# Patient Record
Sex: FEMALE | Race: WHITE
[De-identification: names, ages, dates, MRNs, and addresses within clinical notes are randomized per-mention and may not be internally consistent; named-entity substitution may affect disease eponyms.]

---

## 2018-08-31 ENCOUNTER — HOSPITAL ENCOUNTER (EMERGENCY)
Dept: HOSPITAL 89 - ER | Age: 23
Discharge: HOME | End: 2018-08-31
Payer: COMMERCIAL

## 2018-08-31 VITALS — DIASTOLIC BLOOD PRESSURE: 82 MMHG | SYSTOLIC BLOOD PRESSURE: 107 MMHG

## 2018-08-31 DIAGNOSIS — R22.0: Primary | ICD-10-CM

## 2018-08-31 PROCEDURE — 99283 EMERGENCY DEPT VISIT LOW MDM: CPT

## 2018-08-31 NOTE — ER REPORT
History and Physical


Time Seen By MD:  16:07


HPI/ROS


CHIEF COMPLAINT: Tongue swelling





HISTORY OF PRESENT ILLNESS: This is a 23-year-old female presents to the ER for 


tongue swelling. Patient states that about 50 minutes prior to arrival she had 


some chicken nuggets at [employer], she also had some barbecue sauce. Patient 


states she had a low bit of tingling on the tip of her tongue with what she 


describes as swelling, no respiratory compromise, no other swelling, no 


diaphoresis chest pain, nausea or vomiting. Patient is interacting well, no 


stridor. It is just the tip of the tongue itself.





REVIEW OF SYSTEMS:


Respiratory: No cough, no dyspnea.


Cardiovascular: No chest pain, no palpitations.


Gastrointestinal: No vomiting, no abdominal pain.


Musculoskeletal: No back pain.


ENT: As above.


Allergies:  


Coded Allergies:  


     No Known Drug Allergies (Unverified , 8/31/18)


Home Meds


Reported Medications


[Iud]   No Conflict Check


   8/31/18


Phentermine Hcl (PHENTERMINE HCL) 37.5 Mg Capsule, 37.5 MG PO QDAY, CAPSULE


   8/31/18


Escitalopram Oxalate (LEXAPRO) 20 Mg Tablet, 20 MG PO QDAY, TAB


   8/31/18


Methylphenidate Hcl (METHYLPHENIDATE ER) 36 Mg Tab.er.24, 36 MG PO QDAY


   8/31/18


Discontinued Reported Medications


Methylphenidate Hcl (METHYLPHENIDATE ER) 18 Mg Tab.er.24, 18 MG PO QDAY


   5/31/17


Norgestimate-Ethinyl Estradiol (SPRINTEC) 1 Each Tablet, 1 TAB PO QDAY


   3/14/17


Past Medical/Surgical History


The patient has a past medical and surgical history of polycystic ovarian 


disease. Deviated septum, [place] teeth extraction, tonsillectomy.


Reviewed Nurses Notes:  Yes


Hx Smoking:  No


Smoking Status:  Never Smoker


Hx Substance Use Disorder:  No


Hx Alcohol Use:  No


Constitutional





Vital Sign - Last 24 Hours








 8/31/18 8/31/18 8/31/18





 16:04 16:15 16:30


 


Temp 97.9  


 


Pulse 110 100 97


 


Resp 14  


 


B/P (MAP) 115/87  107/82 (90)


 


Pulse Ox 96 97 97


 


O2 Delivery Room Air  








Physical Exam


  General Appearance: The patient is alert, has no immediate need for airway 


protection and no current signs of toxicity. 


Eyes: Pupils equal and round no injection.


Mouth: No appreciable swelling of the tongue small amount of redness noted to 


the very tip of the tongue. No stridorous lung sounds. Mucosa pink and moist. NO


 edema.


Respiratory: Chest is non tender, lungs are clear to auscultation.


Cardiac: regular rate and rhythm.


Gastrointestinal: Abdomen is soft and non tender, no masses, bowel sounds 


normal.


Musculoskeletal:  Neck: Neck is supple and non tender.


   Extremities have full range of motion and are non tender.


Skin: No rashes or lesions.





DIFFERENTIAL DIAGNOSIS: After history and physical exam differential diagnosis 


was considered for anaphylaxis, allergic reaction.





Medical Decision Making


ED Course/Re-evaluation


ED Course


The patient was admitted to room. A history and physical were obtained. 


Differential diagnoses were considered. After close examination the patient has 


no indication of anaphylaxis or severe allergic reaction, there was just a small


 area of redness and slight irritation to the tip of the tongue. Patient was 


given 25 mg Benadryl and instructed to monitor her food intake. The patient had 


no other questions or concerns at this time and was discharged home.


Decision to Disposition Date:  Aug 31, 2018


Decision to Disposition Time:  16:21





Depart


Departure


Latest Vital Signs





Vital Signs








  Date Time  Temp Pulse Resp B/P (MAP) Pulse Ox O2 Delivery O2 Flow Rate FiO2


 


8/31/18 16:30  97  107/82 (90) 97   


 


8/31/18 16:04 97.9  14   Room Air  








Impression:  


   Primary Impression:  


   Mild tongue swelling


Condition:  Improved


Disposition:  HOME OR SELF-CARE


Patient Instructions:  General Allergic Reaction (ED)





Additional Instructions:  


If you feel the swelling continues can take 25 mg of Benadryl every 4-6 hours as


 needed for swelling.


Should this develop into anything more concerning such as shortness of breath, 


nausea or vomiting or any respiratory compromise call 911 or return to the 


emergency department immediately.


I would avoid the chicken nuggets and the barbecue sauce in the future just to 


ensure no further complications arise.


Follow-up with her primary care provider as scheduled.


Drink plenty of water.


Get plenty of rest.


Return to ER for any other concerns or worsening symptoms.











PAXTON MALHOTRA FNP-BC      Aug 31, 2018 16:06

## 2018-12-06 ENCOUNTER — HOSPITAL ENCOUNTER (EMERGENCY)
Dept: HOSPITAL 89 - ER | Age: 23
Discharge: HOME | End: 2018-12-06
Payer: COMMERCIAL

## 2018-12-06 VITALS — DIASTOLIC BLOOD PRESSURE: 75 MMHG | SYSTOLIC BLOOD PRESSURE: 117 MMHG

## 2018-12-06 DIAGNOSIS — N30.00: Primary | ICD-10-CM

## 2018-12-06 DIAGNOSIS — Z97.5: ICD-10-CM

## 2018-12-06 LAB — PLATELET COUNT, AUTOMATED: 300 K/UL (ref 150–450)

## 2018-12-06 PROCEDURE — 82310 ASSAY OF CALCIUM: CPT

## 2018-12-06 PROCEDURE — 96361 HYDRATE IV INFUSION ADD-ON: CPT

## 2018-12-06 PROCEDURE — 82247 BILIRUBIN TOTAL: CPT

## 2018-12-06 PROCEDURE — 84132 ASSAY OF SERUM POTASSIUM: CPT

## 2018-12-06 PROCEDURE — 74177 CT ABD & PELVIS W/CONTRAST: CPT

## 2018-12-06 PROCEDURE — 84075 ASSAY ALKALINE PHOSPHATASE: CPT

## 2018-12-06 PROCEDURE — 99284 EMERGENCY DEPT VISIT MOD MDM: CPT

## 2018-12-06 PROCEDURE — 81001 URINALYSIS AUTO W/SCOPE: CPT

## 2018-12-06 PROCEDURE — 84703 CHORIONIC GONADOTROPIN ASSAY: CPT

## 2018-12-06 PROCEDURE — 82374 ASSAY BLOOD CARBON DIOXIDE: CPT

## 2018-12-06 PROCEDURE — 82040 ASSAY OF SERUM ALBUMIN: CPT

## 2018-12-06 PROCEDURE — 84295 ASSAY OF SERUM SODIUM: CPT

## 2018-12-06 PROCEDURE — 96374 THER/PROPH/DIAG INJ IV PUSH: CPT

## 2018-12-06 PROCEDURE — 82435 ASSAY OF BLOOD CHLORIDE: CPT

## 2018-12-06 PROCEDURE — 85025 COMPLETE CBC W/AUTO DIFF WBC: CPT

## 2018-12-06 PROCEDURE — 87088 URINE BACTERIA CULTURE: CPT

## 2018-12-06 PROCEDURE — 84155 ASSAY OF PROTEIN SERUM: CPT

## 2018-12-06 PROCEDURE — 84450 TRANSFERASE (AST) (SGOT): CPT

## 2018-12-06 PROCEDURE — 84460 ALANINE AMINO (ALT) (SGPT): CPT

## 2018-12-06 PROCEDURE — 82565 ASSAY OF CREATININE: CPT

## 2018-12-06 PROCEDURE — 82947 ASSAY GLUCOSE BLOOD QUANT: CPT

## 2018-12-06 PROCEDURE — 84520 ASSAY OF UREA NITROGEN: CPT

## 2018-12-06 NOTE — RADIOLOGY IMAGING REPORT
FACILITY: [facility] 

 

PATIENT NAME: Clary Mays

: 1995

MR: [id number]

V: [id number]

EXAM DATE: [id number]

ORDERING PHYSICIAN: BETTINA FRY

TECHNOLOGIST: 

 

Location: [facility]

Patient: Clary Mays

: 1995

MRN: [id number]

Visit/Account:[id number]

Date of Sevice: 2018

 

ACCESSION #: [id number]

 

ABDOMEN/PELVIS WITH CONTRAST

 

HISTORY: Suprapubic abdominal pain. Right CVA discomfort.

 

COMPARISON: None.

 

TECHNIQUE: Axial images were obtained from the lung bases through the symphysis pubis with intravenou
s contrast. Sagittal and coronal reformats were performed.

 

One of the following dose optimization techniques was utilized in the performance of this exam: Autom
ated exposure control; adjustment of the mA and/or kV according to the patient's size; or use of an i
terative reconstruction technique. Specific details can be referenced in the facility's radiology CT 
exam operational policy.

 

CONTRAST: 75 mL IV Isovue-370.

 

 

FINDINGS:

 

Lower chest: There is a 2 x 2 mm left lower lobe pulmonary nodule (image 35 series 2).

 

Liver: Normal.

 

Gallbladder/biliary: Normal.

 

Pancreas: Normal.

 

Spleen: Normal.

 

Adrenals: Normal.

 

Kidneys/ureters/bladder: Normal. There is a tiny amount of gas within bladder, presumed from catheter
ization.

 

GI/mesentery/peritoneal cavity: There is no bowel obstruction. There is no wall thickening or pericol
onic stranding. The appendix is normal. There is no free air or free fluid.

 

Vessels: No atherosclerotic disease. No aneurysm. No dissection. 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

 

Nodes: Normal.

 

Pelvis: IUD is in expected location within the uterus. Ovaries are normal.

 

Bones/vertebra/soft tissues: Normal.

 

IMPRESSION:

1. No findings in the abdomen or pelvis to account for the patient's symptoms.

2. 2 mm average size right lower lobe pulmonary nodule.

 

Current Fleischner Society recommendations for incidental <6mm pulmonary nodule (average of long and 
short axis) on incomplete thoracic CT scan:

No further investigation is recommended given the estimated low risk of malignancy.

Linus H, Marilynn D, Kenneth J, et al. Guidelines for management of small pulmonary nodules detected on
 CT images: from the Fleischner society 2017.

 

Report Dictated By: Yamila Arechiga at 2018 5:31 AM

 

Report E-Signed By: Yamila Arechiga  at 2018 5:39 AM

 

WSN:[id number]

## 2018-12-06 NOTE — ER REPORT
History and Physical


Time Seen By MD:  04:10


Hx. of Stated Complaint:  


Pt states "I was messing around with my IUD string and now my belly hurts and I 


have blood in my discharge."


HPI/ROS


CHIEF COMPLAINT: abdominal pain





HISTORY OF PRESENT ILLNESS: This is a 23 year old female. She reports having 


some suprapubic area and periumbilical abdominal pain. This started suddenly 


tonight. She was messing around with her IUD string while having sexual activity


when she started having the pain. She did not feel the string give way or 


lengthen or shorten. She is having a little bit of spotting with her vaginal 


discharge as well. She has some nausea. Pain is described as crampy and does not


radiate. She has had no problems with urination or bowels earlier today and has 


been in good health. No history of abdominal surgeries. No fevers or chills.


Allergies:  


Coded Allergies:  


     No Known Drug Allergies (Unverified , 12/6/18)


Home Meds


Active Scripts


Ondansetron (ONDANSETRON ODT) 4 Mg Tab.rapdis, 4 MG PO Q6H PRN for 


NAUSEA/VOMITING, #20 TAB 0 Refills


   Prov:BETTINA FRY MD         12/6/18


Sulfamethoxazole/Trimet 800-160 Mg Tab (BACTRIM DS TABLET) 1 Each Tablet, 1 TAB 


PO Q12H, #14 TAB 0 Refills


   Prov:BETTINA FRY MD         12/6/18


Reported Medications


[Iud]   No Conflict Check


   8/31/18


Escitalopram Oxalate (LEXAPRO) 20 Mg Tablet, 20 MG PO QDAY, TAB


   8/31/18


Methylphenidate Hcl (METHYLPHENIDATE ER) 36 Mg Tab.er.24, 36 MG PO QDAY


   8/31/18


Discontinued Reported Medications


Phentermine Hcl (PHENTERMINE HCL) 37.5 Mg Capsule, 37.5 MG PO QDAY, CAPSULE


   8/31/18


Reviewed Nurses Notes:  Yes


Hx Smoking:  No


Smoking Status:  Never Smoker


Hx Substance Use Disorder:  No


Hx Alcohol Use:  No


Constitutional





Vital Sign - Last 24 Hours








 12/6/18 12/6/18 12/6/18 12/6/18





 03:43 03:46 03:54 04:00


 


Temp 97.9   


 


Pulse 79  86 


 


Resp 16   


 


B/P (MAP) 139/101 139/101 (114)  134/91 (105)


 


Pulse Ox 94  95 


 


O2 Delivery Room Air   


 


    





 12/6/18 12/6/18 12/6/18 12/6/18





 04:09 04:24 04:30 04:39


 


Pulse 82 74  79


 


B/P (MAP)   115/87 (96) 


 


Pulse Ox 96 94  93





 12/6/18 12/6/18 12/6/18 12/6/18





 04:54 05:14 05:24 05:29


 


Pulse 79  92 92


 


B/P (MAP)  134/98 (110)  


 


Pulse Ox 96   95





 12/6/18 12/6/18  





 05:30 05:44  


 


Pulse  83  


 


B/P (MAP) 117/75 (89)   


 


Pulse Ox  94  














Intake and Output   


 


 12/5/18 12/5/18 12/6/18





 14:59 22:59 06:59


 


Intake Total   1000 ml


 


Balance   1000 ml








Physical Exam


   General Appearance: The patient is alert. No acute distress.


Eyes: Pupils are equal, round. No pallor, injection or icterus.


ENT: Mucous membranes are moist.


Neck: Supple and non tender.


Respiratory: Lungs are clear to auscultation.


Cardiovascular: Regular rate and rhythm. No murmurs, gallops or rubs. Normal 


capillary refill.


Gastrointestinal: Abdomen is soft. Tender in suprapubic and periumbilical area. 


Nondistended. Mild guarding, but no rebound. Normal active bowel sounds. Has 


some discomfort in the right CVA, but no left CVA tenderness.


Genitourinary: Pelvic exam performed with chaperone. Normal perineal area and 


external vaginal/labia area. Vagina with some brownish discharge external area, 


internally near the cervix, mild whitish discharge. No bleeding. Cervical os is 


closed and I see the IUD string, but no evidence of seeing the IUD. The exam is 


tender, but not significantly so. No masses noted.


Neurological: Alert and oriented x3. 


Skin: Warm and dry. No rashes.


   


DIFFERENTIAL DIAGNOSIS: After history and physical exam, differential diagnosis 


was considered for a patient with onset of pain tonight, associated with messing


 around with and gentle pulling of the IUD string. Followed by some mild 


discharge that was blood tinged. Exam shows suprapubic and periumbilical area 


cramping pain and discomfort. She also has the right flank pain. Spotting could 


be due to the pulling on IUD string, but should not have caused flank 


discomfort. Other etiology of abdominal pain and nausea that happened at the 


same time? After discussing these findings with the patient and discussing 


different ideas of evaluation with blood work and urine testing and then 


deciding between imaging, including ultrasound versus CT scan. We talked about 


the benefits and drawbacks of each. Given the flank pain, I recommended starting


 with CT scan to rule out urinary tract related problems as well as appendix and


 bowel. This would also demonstrate the position of the IUD. I did offer to do 


ultrasound first, which would look at the uterus, IUD, and adnexa, but would not


 be as ideal for the other areas of the abdomen, but would not include the 


radiation. After our conversation, the patient is agreeable to have the CT scan 


and once this is done, we can consider ultrasound if needed.





Medical Decision Making


Data Points


Result Diagram:  


12/6/18 0426                                                                    


            12/6/18 0426





Laboratory





Hematology








Test


 12/6/18


03:43 12/6/18


04:26


 


Urine Color Yellow  


 


Urine Clarity


 Slightly-cloudy


 





 


Urine pH


 6.0 pH


(4.8-9.5) 





 


Urine Specific Gravity 1.026  


 


Urine Protein


 30 mg/dL


(NEGATIVE) 





 


Urine Glucose (UA)


 Negative mg/dL


(NEGATIVE) 





 


Urine Ketones


 Negative mg/dL


(NEGATIVE) 





 


Urine Blood


 Small


(NEGATIVE) 





 


Urine Nitrite


 Negative


(NEGATIVE) 





 


Urine Bilirubin


 Negative


(NEGATIVE) 





 


Urine Urobilinogen


 2.0 mg/dL


(0.2-1.9) 





 


Urine Leukocyte Esterase


 Moderate


(NEGATIVE) 





 


Urine RBC


 5 /HPF


(0-2/HPF) 





 


Urine WBC


 16 /HPF


(0-5/HPF) 





 


Urine Squamous Epithelial


Cells Many /LPF


(</=FEW) 





 


Urine Amorphous Crystals Few /HPF  


 


Urine Bacteria


 Few /HPF


(NONE-FEW) 





 


Urine Mucus


 Few /HPF


(NONE-FEW) 





 


Red Blood Count


 


 4.69 M/uL


(4.17-5.56)


 


Mean Corpuscular Volume


 


 88.3 fL


(80.0-96.0)


 


Mean Corpuscular Hemoglobin


 


 29.8 pg


(26.0-33.0)


 


Mean Corpuscular Hemoglobin


Concent 


 33.8 g/dL


(32.0-36.0)


 


Red Cell Distribution Width


 


 13.5 %


(11.5-14.5)


 


Mean Platelet Volume


 


 7.8 fL


(7.2-11.1)


 


Neutrophils (%) (Auto)


 


 68.1 %


(39.4-72.5)


 


Lymphocytes (%) (Auto)


 


 23.0 %


(17.6-49.6)


 


Monocytes (%) (Auto)


 


 7.5 %


(4.1-12.4)


 


Eosinophils (%) (Auto)


 


 0.6 %


(0.4-6.7)


 


Basophils (%) (Auto)


 


 0.8 %


(0.3-1.4)


 


Nucleated RBC Relative Count


(auto) 


 0.0 /100WBC 





 


Neutrophils # (Auto)


 


 7.5 K/uL


(2.0-7.4)


 


Lymphocytes # (Auto)


 


 2.5 K/uL


(1.3-3.6)


 


Monocytes # (Auto)


 


 0.8 K/uL


(0.3-1.0)


 


Eosinophils # (Auto)


 


 0.1 K/uL


(0.0-0.5)


 


Basophils # (Auto)


 


 0.1 K/uL


(0.0-0.1)


 


Nucleated RBC Absolute Count


(auto) 


 0.00 K/uL 





 


Sodium Level


 


 138 mmol/L


(137-145)


 


Potassium Level


 


 3.7 mmol/L


(3.5-5.0)


 


Chloride Level


 


 106 mmol/L


()


 


Carbon Dioxide Level


 


 23 mmol/L


(22-31)


 


Blood Urea Nitrogen


 


 15 mg/dl


(7-18)


 


Creatinine


 


 0.80 mg/dl


(0.52-1.04)


 


Glomerular Filtration Rate


Calc 


 > 60.0 





 


Random Glucose


 


 93 mg/dl


()


 


Calcium Level


 


 8.9 mg/dl


(8.4-10.2)


 


Total Bilirubin


 


 0.8 mg/dl


(0.2-1.3)


 


Aspartate Amino Transf


(AST/SGOT) 


 22 U/L (0-35) 





 


Alanine Aminotransferase


(ALT/SGPT) 


 31 U/L (0-56) 





 


Alkaline Phosphatase


 


 103 U/L


(0-126)


 


Total Protein


 


 7.2 g/dl


(6.3-8.2)


 


Albumin


 


 4.1 g/dl


(3.5-5.0)


 


Human Chorionic Gonadotropin,


Qual 


 Negative


(NEGATIVE)








Chemistry








Test


 12/6/18


03:43 12/6/18


04:26


 


Urine Color Yellow  


 


Urine Clarity


 Slightly-cloudy


 





 


Urine pH


 6.0 pH


(4.8-9.5) 





 


Urine Specific Gravity 1.026  


 


Urine Protein


 30 mg/dL


(NEGATIVE) 





 


Urine Glucose (UA)


 Negative mg/dL


(NEGATIVE) 





 


Urine Ketones


 Negative mg/dL


(NEGATIVE) 





 


Urine Blood


 Small


(NEGATIVE) 





 


Urine Nitrite


 Negative


(NEGATIVE) 





 


Urine Bilirubin


 Negative


(NEGATIVE) 





 


Urine Urobilinogen


 2.0 mg/dL


(0.2-1.9) 





 


Urine Leukocyte Esterase


 Moderate


(NEGATIVE) 





 


Urine RBC


 5 /HPF


(0-2/HPF) 





 


Urine WBC


 16 /HPF


(0-5/HPF) 





 


Urine Squamous Epithelial


Cells Many /LPF


(</=FEW) 





 


Urine Amorphous Crystals Few /HPF  


 


Urine Bacteria


 Few /HPF


(NONE-FEW) 





 


Urine Mucus


 Few /HPF


(NONE-FEW) 





 


White Blood Count


 


 11.0 k/uL


(4.5-11.0)


 


Red Blood Count


 


 4.69 M/uL


(4.17-5.56)


 


Hemoglobin


 


 14.0 g/dL


(12.0-16.0)


 


Hematocrit


 


 41.4 %


(34.0-47.0)


 


Mean Corpuscular Volume


 


 88.3 fL


(80.0-96.0)


 


Mean Corpuscular Hemoglobin


 


 29.8 pg


(26.0-33.0)


 


Mean Corpuscular Hemoglobin


Concent 


 33.8 g/dL


(32.0-36.0)


 


Red Cell Distribution Width


 


 13.5 %


(11.5-14.5)


 


Platelet Count


 


 300 K/uL


(150-450)


 


Mean Platelet Volume


 


 7.8 fL


(7.2-11.1)


 


Neutrophils (%) (Auto)


 


 68.1 %


(39.4-72.5)


 


Lymphocytes (%) (Auto)


 


 23.0 %


(17.6-49.6)


 


Monocytes (%) (Auto)


 


 7.5 %


(4.1-12.4)


 


Eosinophils (%) (Auto)


 


 0.6 %


(0.4-6.7)


 


Basophils (%) (Auto)


 


 0.8 %


(0.3-1.4)


 


Nucleated RBC Relative Count


(auto) 


 0.0 /100WBC 





 


Neutrophils # (Auto)


 


 7.5 K/uL


(2.0-7.4)


 


Lymphocytes # (Auto)


 


 2.5 K/uL


(1.3-3.6)


 


Monocytes # (Auto)


 


 0.8 K/uL


(0.3-1.0)


 


Eosinophils # (Auto)


 


 0.1 K/uL


(0.0-0.5)


 


Basophils # (Auto)


 


 0.1 K/uL


(0.0-0.1)


 


Nucleated RBC Absolute Count


(auto) 


 0.00 K/uL 





 


Glomerular Filtration Rate


Calc 


 > 60.0 





 


Calcium Level


 


 8.9 mg/dl


(8.4-10.2)


 


Total Bilirubin


 


 0.8 mg/dl


(0.2-1.3)


 


Aspartate Amino Transf


(AST/SGOT) 


 22 U/L (0-35) 





 


Alanine Aminotransferase


(ALT/SGPT) 


 31 U/L (0-56) 





 


Alkaline Phosphatase


 


 103 U/L


(0-126)


 


Total Protein


 


 7.2 g/dl


(6.3-8.2)


 


Albumin


 


 4.1 g/dl


(3.5-5.0)


 


Human Chorionic Gonadotropin,


Qual 


 Negative


(NEGATIVE)








Urinalysis








Test


 12/6/18


03:43


 


Urine Color Yellow 


 


Urine Clarity


 Slightly-cloudy





 


Urine pH


 6.0 pH


(4.8-9.5)


 


Urine Specific Gravity 1.026 


 


Urine Protein


 30 mg/dL


(NEGATIVE)


 


Urine Glucose (UA)


 Negative mg/dL


(NEGATIVE)


 


Urine Ketones


 Negative mg/dL


(NEGATIVE)


 


Urine Blood


 Small


(NEGATIVE)


 


Urine Nitrite


 Negative


(NEGATIVE)


 


Urine Bilirubin


 Negative


(NEGATIVE)


 


Urine Urobilinogen


 2.0 mg/dL


(0.2-1.9)


 


Urine Leukocyte Esterase


 Moderate


(NEGATIVE)


 


Urine RBC


 5 /HPF


(0-2/HPF)


 


Urine WBC


 16 /HPF


(0-5/HPF)


 


Urine Squamous Epithelial


Cells Many /LPF


(</=FEW)


 


Urine Amorphous Crystals Few /HPF 


 


Urine Bacteria


 Few /HPF


(NONE-FEW)


 


Urine Mucus


 Few /HPF


(NONE-FEW)











EKG/Imaging


Imaging


ABDOMEN/PELVIS WITH CONTRAST


 


HISTORY: Suprapubic abdominal pain. Right CVA discomfort.


 


COMPARISON: None.


 


TECHNIQUE: Axial images were obtained from the lung bases through the symphysis 


pubis with intravenous contrast. Sagittal and coronal reformats were performed.


 


One of the following dose optimization techniques was utilized in the 


performance of this exam: Automated exposure control; adjustment of the mA 


and/or kV according to the patient's size; or use of an iterative reconstruction


 technique. Specific details can be referenced in the facility's radiology CT 


exam operational policy.


 


CONTRAST: 75 mL IV Isovue-370.


 


 


FINDINGS:


 


Lower chest: There is a 2 x 2 mm left lower lobe pulmonary nodule (image 35 


series 2).


 


Liver: Normal.


 


Gallbladder/biliary: Normal.


 


Pancreas: Normal.


 


Spleen: Normal.


 


Adrenals: Normal.


 


Kidneys/ureters/bladder: Normal. There is a tiny amount of gas within bladder, 


presumed from catheterization.


 


GI/mesentery/peritoneal cavity: There is no bowel obstruction. There is no wall 


thickening or pericolonic stranding. The appendix is normal. There is no free 


air or free fluid.


 


Vessels: No atherosclerotic disease. No aneurysm. No dissection. The hepatic 


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


 


Nodes: Normal.


 


Pelvis: IUD is in expected location within the uterus. Ovaries are normal.


 


Bones/vertebra/soft tissues: Normal.


 


IMPRESSION:


1. No findings in the abdomen or pelvis to account for the patient's symptoms.


2. 2 mm average size right lower lobe pulmonary nodule.


 


Current Fleischner Society recommendations for incidental <6mm pulmonary nodule 


(average of long and short axis) on incomplete thoracic CT scan:


No further investigation is recommended given the estimated low risk of 


malignancy.


Linus H, Nasebastian D, Goo J, et al. Guidelines for management of small 


pulmonary nodules detected on CT images: from the Fleischner society 2017.


 


Report Dictated By: Yamila Arechiga at 12/6/2018 5:31 AM





ED Course/Re-evaluation


Clinical Indication for ER IV:  IV Access


ED Course


The patient had CT scan done and was negative other than some gas in the 


bladder. IUD in  normal position. Urinalysis with changes that suggest urinary 


tract infection. This would also explain the pain on exam and her nausea. 


Treating with Bactrim. See below.


Decision to Disposition Date:  Dec 6, 2018


Decision to Disposition Time:  05:48





Depart


Departure


Latest Vital Signs





Vital Signs








  Date Time  Temp Pulse Resp B/P (MAP) Pulse Ox O2 Delivery O2 Flow Rate FiO2


 


12/6/18 05:44  83   94   


 


12/6/18 05:30    117/75 (89)    


 


12/6/18 03:43 97.9  16   Room Air  








Impression:  


   Primary Impression:  


   Urinary tract infection


Condition:  Improved


Disposition:  HOME OR SELF-CARE


New Scripts


Ondansetron (ONDANSETRON ODT) 4 Mg Tab.rapdis


4 MG PO Q6H PRN for NAUSEA/VOMITING, #20 TAB 0 Refills


   Prov: BETTINA FRY MD         12/6/18 


Sulfamethoxazole/Trimet 800-160 Mg Tab (BACTRIM DS TABLET) 1 Each Tablet


1 TAB PO Q12H, #14 TAB 0 Refills


   Prov: BETTINA FRY MD         12/6/18


Patient Instructions:  Urinary Tract Infection in Women (ED)





Additional Instructions:  


Bactrim DS twice a day for 7 days.


Ibuprofen 200mg over the counter tablets, take 3 tablets every 6 hours as needed


 for pain.


Increase fluid intake.


Zofran 4mg, one every 6 hours as needed for nausea.


Return for re-evaluation if having increasing pain, nausea/vomiting, or 


fever/chills.





Problem Qualifiers








   Primary Impression:  


   Urinary tract infection


   Urinary tract infection type:  acute cystitis  Hematuria presence:  without 


   hematuria  Qualified Codes:  N30.00 - Acute cystitis without hematuria








BETTINA FRY MD              Dec 6, 2018 04:10

## 2018-12-10 ENCOUNTER — HOSPITAL ENCOUNTER (EMERGENCY)
Dept: HOSPITAL 89 - ER | Age: 23
Discharge: HOME | End: 2018-12-10
Payer: COMMERCIAL

## 2018-12-10 VITALS — SYSTOLIC BLOOD PRESSURE: 134 MMHG | DIASTOLIC BLOOD PRESSURE: 86 MMHG

## 2018-12-10 DIAGNOSIS — B27.10: Primary | ICD-10-CM

## 2018-12-10 LAB — PLATELET COUNT, AUTOMATED: 285 K/UL (ref 150–450)

## 2018-12-10 PROCEDURE — 74177 CT ABD & PELVIS W/CONTRAST: CPT

## 2018-12-10 PROCEDURE — 99284 EMERGENCY DEPT VISIT MOD MDM: CPT

## 2018-12-10 PROCEDURE — 84075 ASSAY ALKALINE PHOSPHATASE: CPT

## 2018-12-10 PROCEDURE — 82435 ASSAY OF BLOOD CHLORIDE: CPT

## 2018-12-10 PROCEDURE — 82565 ASSAY OF CREATININE: CPT

## 2018-12-10 PROCEDURE — 84295 ASSAY OF SERUM SODIUM: CPT

## 2018-12-10 PROCEDURE — 85025 COMPLETE CBC W/AUTO DIFF WBC: CPT

## 2018-12-10 PROCEDURE — 82947 ASSAY GLUCOSE BLOOD QUANT: CPT

## 2018-12-10 PROCEDURE — 81001 URINALYSIS AUTO W/SCOPE: CPT

## 2018-12-10 PROCEDURE — 82247 BILIRUBIN TOTAL: CPT

## 2018-12-10 PROCEDURE — 84450 TRANSFERASE (AST) (SGOT): CPT

## 2018-12-10 PROCEDURE — 84132 ASSAY OF SERUM POTASSIUM: CPT

## 2018-12-10 PROCEDURE — 84460 ALANINE AMINO (ALT) (SGPT): CPT

## 2018-12-10 PROCEDURE — 84703 CHORIONIC GONADOTROPIN ASSAY: CPT

## 2018-12-10 PROCEDURE — 83690 ASSAY OF LIPASE: CPT

## 2018-12-10 PROCEDURE — 86308 HETEROPHILE ANTIBODY SCREEN: CPT

## 2018-12-10 PROCEDURE — 82310 ASSAY OF CALCIUM: CPT

## 2018-12-10 PROCEDURE — 82374 ASSAY BLOOD CARBON DIOXIDE: CPT

## 2018-12-10 PROCEDURE — 96360 HYDRATION IV INFUSION INIT: CPT

## 2018-12-10 PROCEDURE — 96361 HYDRATE IV INFUSION ADD-ON: CPT

## 2018-12-10 PROCEDURE — 82040 ASSAY OF SERUM ALBUMIN: CPT

## 2018-12-10 PROCEDURE — 82150 ASSAY OF AMYLASE: CPT

## 2018-12-10 PROCEDURE — 84155 ASSAY OF PROTEIN SERUM: CPT

## 2018-12-10 PROCEDURE — 84520 ASSAY OF UREA NITROGEN: CPT

## 2018-12-10 NOTE — ER REPORT
History and Physical


Time Seen By MD:  17:10


Hx. of Stated Complaint:  


C/O MUSCLE ACHES, RIGHT GREATER THAN LEFT BACK PAIN, DYSURIA. SEEN LAST WEEK AND





BEING TXD WITH BACTRIM FOR UTI.


HPI/ROS


CHIEF COMPLAINT: Bilateral flank pain, not feeling well.





HISTORY OF PRESENT ILLNESS: 23-year-old female patient presents to emergency 


room with complaint of bilateral flank pain and not feeling well. Patient states


this been going on for last several days. She was started on antibiotic for 


urinary tract infection recently is concerned that the urinary tract infection 


could be worsening as she has noticed more flank pain. She denies any nausea, 


vomiting or diarrhea. Patient states she is been really run down today and slept


throughout most the day. Patient has been taking the Bactrim but denies taking 


any other medications for this.





REVIEW OF SYSTEMS:


Respiratory: No cough, no dyspnea.


Cardiovascular: No chest pain, no palpitations.


Gastrointestinal: No vomiting, no abdominal pain.


Musculoskeletal: As noted above


Allergies:  


Coded Allergies:  


     No Known Drug Allergies (Unverified , 12/10/18)


Home Meds


Active Scripts


Ondansetron 4 Mg Odt (ONDANSETRON 4 MG ODT) 4 Mg Tab.rapdis, 4 MG PO Q6H PRN for


NAUSEA/VOMITING, #20 TAB 0 Refills


   Prov:BETTINA FRY MD         12/6/18


Sulfamethoxazole/Trimet 800-160 Mg Tab (BACTRIM DS TABLET) 1 Each Tablet, 1 TAB 


PO Q12H, #14 TAB 0 Refills


   Prov:BETTINA FRY MD         12/6/18


Reported Medications


[Iud]   No Conflict Check


   8/31/18


Escitalopram Oxalate (LEXAPRO) 20 Mg Tablet, 20 MG PO QDAY, TAB


   8/31/18


Methylphenidate Hcl (METHYLPHENIDATE ER) 36 Mg Tab.er.24, 36 MG PO QDAY


   8/31/18


Discontinued Reported Medications


Phentermine Hcl (PHENTERMINE HCL) 37.5 Mg Capsule, 37.5 MG PO QDAY, CAPSULE


   8/31/18


Past Medical/Surgical History


Patient has a past medical history of PCOS, depression.


Patient has surgical history of septal repair, wisdom teeth removal, 


tonsillectomy, adenoidectomy.


Hx Smoking:  No


Smoking Status:  Never Smoker


Hx Substance Use Disorder:  No


Hx Alcohol Use:  No


Constitutional





Vital Sign - Last 24 Hours








 12/10/18 12/10/18 12/10/18 12/10/18





 17:00 17:02 17:09 17:24


 


Temp 98.5   


 


Pulse 96  96 94


 


Resp 14   


 


B/P (MAP) 124/83 124/83 (97)  


 


Pulse Ox 95  95 95


 


O2 Delivery Room Air   


 


    





 12/10/18 12/10/18 12/10/18 12/10/18





 17:30 17:39 17:54 18:00


 


Pulse  88 92 


 


B/P (MAP) 117/74 (88)   114/70 (85)


 


Pulse Ox  95 95 


 


O2 Delivery   Room Air 





 12/10/18 12/10/18 12/10/18 12/10/18





 18:09 18:24 18:30 18:35


 


Pulse 87 87  88


 


B/P (MAP)   113/70 (84) 


 


Pulse Ox 98 98  98


 


O2 Delivery Room Air Room Air  





 12/10/18 12/10/18  





 19:00 19:05  


 


Pulse  88  


 


B/P (MAP) 134/86 (102)   


 


Pulse Ox  100  








Physical Exam


  General Appearance: The patient is alert, has no immediate need for airway 


protection and no current signs of toxicity. 


Respiratory: Chest is non tender, lungs are clear to auscultation.


Cardiac: regular rate and rhythm


Gastrointestinal: Abdomen is soft and non tender, no masses, bowel sounds 


normal. Patient does have bilateral CVA tenderness.


Musculoskeletal:  Neck: Neck is supple and non tender.


   Extremities have full range of motion and are non tender.


Skin: No rashes or lesions.





DIFFERENTIAL DIAGNOSIS: After history and physical exam differential diagnosis 


was considered for UTI, pyelonephritis, viral syndrome.





Medical Decision Making


Data Points


Result Diagram:  


12/10/18 1722                                                                   


             12/10/18 1722





Laboratory





Hematology








Test


 12/10/18


16:59 12/10/18


17:22


 


Urine Color Yellow  


 


Urine Clarity Clear  


 


Urine pH


 5.0 pH


(4.8-9.5) 





 


Urine Specific Gravity 1.025  


 


Urine Protein


 Negative mg/dL


(NEGATIVE) 





 


Urine Glucose (UA)


 Negative mg/dL


(NEGATIVE) 





 


Urine Ketones


 Negative mg/dL


(NEGATIVE) 





 


Urine Blood


 Negative


(NEGATIVE) 





 


Urine Nitrite


 Negative


(NEGATIVE) 





 


Urine Bilirubin


 Negative


(NEGATIVE) 





 


Urine Urobilinogen


 Negative mg/dL


(0.2-1.9) 





 


Urine Leukocyte Esterase


 Trace


(NEGATIVE) 





 


Urine RBC


 <1 /HPF


(0-2/HPF) 





 


Urine WBC


 2 /HPF


(0-5/HPF) 





 


Urine Squamous Epithelial


Cells Many /LPF


(</=FEW) 





 


Urine Bacteria


 Negative /HPF


(NONE-FEW) 





 


Urine Mucus


 Few /HPF


(NONE-FEW) 





 


Red Blood Count


 


 4.89 M/uL


(4.17-5.56)


 


Mean Corpuscular Volume


 


 89.4 fL


(80.0-96.0)


 


Mean Corpuscular Hemoglobin


 


 29.8 pg


(26.0-33.0)


 


Mean Corpuscular Hemoglobin


Concent 


 33.4 g/dL


(32.0-36.0)


 


Red Cell Distribution Width


 


 13.6 %


(11.5-14.5)


 


Mean Platelet Volume


 


 7.5 fL


(7.2-11.1)


 


Neutrophils (%) (Auto)


 


 61.6 %


(39.4-72.5)


 


Lymphocytes (%) (Auto)


 


 17.0 %


(17.6-49.6)


 


Monocytes (%) (Auto)


 


 18.8 %


(4.1-12.4)


 


Eosinophils (%) (Auto)


 


 1.8 %


(0.4-6.7)


 


Basophils (%) (Auto)


 


 0.8 %


(0.3-1.4)


 


Nucleated RBC Relative Count


(auto) 


 0.1 /100WBC 





 


Neutrophils # (Auto)


 


 2.8 K/uL


(2.0-7.4)


 


Lymphocytes # (Auto)


 


 0.8 K/uL


(1.3-3.6)


 


Monocytes # (Auto)


 


 0.9 K/uL


(0.3-1.0)


 


Eosinophils # (Auto)


 


 0.1 K/uL


(0.0-0.5)


 


Basophils # (Auto)


 


 0.0 K/uL


(0.0-0.1)


 


Nucleated RBC Absolute Count


(auto) 


 0.00 K/uL 





 


Sodium Level


 


 138 mmol/L


(137-145)


 


Potassium Level


 


 4.1 mmol/L


(3.5-5.0)


 


Chloride Level


 


 107 mmol/L


()


 


Carbon Dioxide Level


 


 21 mmol/L


(22-31)


 


Blood Urea Nitrogen


 


 14 mg/dl


(7-18)


 


Creatinine


 


 0.90 mg/dl


(0.52-1.04)


 


Glomerular Filtration Rate


Calc 


 > 60.0 





 


Random Glucose


 


 107 mg/dl


()


 


Calcium Level


 


 8.8 mg/dl


(8.4-10.2)


 


Total Bilirubin


 


 0.6 mg/dl


(0.2-1.3)


 


Aspartate Amino Transf


(AST/SGOT) 


 19 U/L (0-35) 





 


Alanine Aminotransferase


(ALT/SGPT) 


 24 U/L (0-56) 





 


Alkaline Phosphatase  95 U/L (0-126) 


 


Total Protein


 


 7.2 g/dl


(6.3-8.2)


 


Albumin


 


 4.0 g/dl


(3.5-5.0)


 


Amylase Level  54 U/L (0-110) 


 


Lipase


 


 72 U/L


()


 


Human Chorionic Gonadotropin,


Qual 


 Negative


(NEGATIVE)


 


Monoscreen


 


 Positive


(NEGATIVE)








Chemistry








Test


 12/10/18


16:59 12/10/18


17:22


 


Urine Color Yellow  


 


Urine Clarity Clear  


 


Urine pH


 5.0 pH


(4.8-9.5) 





 


Urine Specific Gravity 1.025  


 


Urine Protein


 Negative mg/dL


(NEGATIVE) 





 


Urine Glucose (UA)


 Negative mg/dL


(NEGATIVE) 





 


Urine Ketones


 Negative mg/dL


(NEGATIVE) 





 


Urine Blood


 Negative


(NEGATIVE) 





 


Urine Nitrite


 Negative


(NEGATIVE) 





 


Urine Bilirubin


 Negative


(NEGATIVE) 





 


Urine Urobilinogen


 Negative mg/dL


(0.2-1.9) 





 


Urine Leukocyte Esterase


 Trace


(NEGATIVE) 





 


Urine RBC


 <1 /HPF


(0-2/HPF) 





 


Urine WBC


 2 /HPF


(0-5/HPF) 





 


Urine Squamous Epithelial


Cells Many /LPF


(</=FEW) 





 


Urine Bacteria


 Negative /HPF


(NONE-FEW) 





 


Urine Mucus


 Few /HPF


(NONE-FEW) 





 


White Blood Count


 


 4.6 k/uL


(4.5-11.0)


 


Red Blood Count


 


 4.89 M/uL


(4.17-5.56)


 


Hemoglobin


 


 14.6 g/dL


(12.0-16.0)


 


Hematocrit


 


 43.7 %


(34.0-47.0)


 


Mean Corpuscular Volume


 


 89.4 fL


(80.0-96.0)


 


Mean Corpuscular Hemoglobin


 


 29.8 pg


(26.0-33.0)


 


Mean Corpuscular Hemoglobin


Concent 


 33.4 g/dL


(32.0-36.0)


 


Red Cell Distribution Width


 


 13.6 %


(11.5-14.5)


 


Platelet Count


 


 285 K/uL


(150-450)


 


Mean Platelet Volume


 


 7.5 fL


(7.2-11.1)


 


Neutrophils (%) (Auto)


 


 61.6 %


(39.4-72.5)


 


Lymphocytes (%) (Auto)


 


 17.0 %


(17.6-49.6)


 


Monocytes (%) (Auto)


 


 18.8 %


(4.1-12.4)


 


Eosinophils (%) (Auto)


 


 1.8 %


(0.4-6.7)


 


Basophils (%) (Auto)


 


 0.8 %


(0.3-1.4)


 


Nucleated RBC Relative Count


(auto) 


 0.1 /100WBC 





 


Neutrophils # (Auto)


 


 2.8 K/uL


(2.0-7.4)


 


Lymphocytes # (Auto)


 


 0.8 K/uL


(1.3-3.6)


 


Monocytes # (Auto)


 


 0.9 K/uL


(0.3-1.0)


 


Eosinophils # (Auto)


 


 0.1 K/uL


(0.0-0.5)


 


Basophils # (Auto)


 


 0.0 K/uL


(0.0-0.1)


 


Nucleated RBC Absolute Count


(auto) 


 0.00 K/uL 





 


Glomerular Filtration Rate


Calc 


 > 60.0 





 


Calcium Level


 


 8.8 mg/dl


(8.4-10.2)


 


Total Bilirubin


 


 0.6 mg/dl


(0.2-1.3)


 


Aspartate Amino Transf


(AST/SGOT) 


 19 U/L (0-35) 





 


Alanine Aminotransferase


(ALT/SGPT) 


 24 U/L (0-56) 





 


Alkaline Phosphatase  95 U/L (0-126) 


 


Total Protein


 


 7.2 g/dl


(6.3-8.2)


 


Albumin


 


 4.0 g/dl


(3.5-5.0)


 


Amylase Level  54 U/L (0-110) 


 


Lipase


 


 72 U/L


()


 


Human Chorionic Gonadotropin,


Qual 


 Negative


(NEGATIVE)


 


Monoscreen


 


 Positive


(NEGATIVE)








Urinalysis








Test


 12/10/18


16:59


 


Urine Color Yellow 


 


Urine Clarity Clear 


 


Urine pH


 5.0 pH


(4.8-9.5)


 


Urine Specific Gravity 1.025 


 


Urine Protein


 Negative mg/dL


(NEGATIVE)


 


Urine Glucose (UA)


 Negative mg/dL


(NEGATIVE)


 


Urine Ketones


 Negative mg/dL


(NEGATIVE)


 


Urine Blood


 Negative


(NEGATIVE)


 


Urine Nitrite


 Negative


(NEGATIVE)


 


Urine Bilirubin


 Negative


(NEGATIVE)


 


Urine Urobilinogen


 Negative mg/dL


(0.2-1.9)


 


Urine Leukocyte Esterase


 Trace


(NEGATIVE)


 


Urine RBC


 <1 /HPF


(0-2/HPF)


 


Urine WBC


 2 /HPF


(0-5/HPF)


 


Urine Squamous Epithelial


Cells Many /LPF


(</=FEW)


 


Urine Bacteria


 Negative /HPF


(NONE-FEW)


 


Urine Mucus


 Few /HPF


(NONE-FEW)











EKG/Imaging


Imaging


COMPUTED TOMOGRAPHY OF THE Abdomen and Pelvis with  CONTRAST


 


INDICATION: Bilateral flank pain.


 


TECHNIQUE: Contiguous axial 3.0 mm CT images were obtained through the abdomen 


and pelvis  after 75 mL Isovue-370. Coronal and sagittal reformatted images were


 submitted.


 


COMPARISON: CT abdomen and pelvis December 6, 2018.


 


FINDINGS:


 


Lung bases: The lung bases are clear.


 


Liver and hepatic vasculature:  No focal lesion.


 


Gallbladder and bile ducts:  Normal


 


Spleen:  Normal


 


Pancreas:  Normal


 


Adrenals:  Normal


 


Kidneys, ureters and bladder:  Symmetric enhancement.  No stone or obstruction. 


 Normal-appearing bladder.


 


Retroperitoneum and aorta:  Normal caliber aorta.


 


GI tract, mesentery and peritoneum: Normal appendix.  No bowel obstruction.  No 


free fluid or free air.  The stomach is mildly distended, presumably with food.


 


Uterus and adnexa: An IUD is centrally positioned within the uterine fundus.  


Both ovaries are mildly prominent with several small follicles noted


 


Bones and soft tissues: No acute osseous abnormality.


 


IMPRESSION:


 


1.  No urinary tract calculus or collecting system obstruction.


2.  Normal appendix.


3.  Mildly prominent ovaries.


 


 


One of the following dose optimization techniques was utilized in the 


performance of this exam: Automated exposure control; adjustment of the mA 


and/or kV according to the patient's size; or use of an iterative  


reconstruction technique.  Specific details can be referenced in the facility's 


radiology CT exam operational policy.


 


 


Report Dictated By: Kaye Stubbs MD at 12/10/2018 6:06 PM


 


Report E-Signed By: Kaye Stubbs MD  at 12/10/2018 6:18 PM





ED Course/Re-evaluation


ED Course


Patient was admitted to an exam room, history and physical were obtained. 


Differential diagnoses were considered. On examination lungs are clear, heart is


 regular, abdomen is soft and mildly tender. Patient did have bilateral flank pa


in. A CBC, CMP, urinalysis, CT scan of abdomen and pelvis were done. I was 


concerned about possible pyelonephritis. Patient did have a normal white count 


although she did have elevated monocyte count. Remainder of her labs were 


unremarkable. Urine does show significant improvement. CT scan of abdomen and 


pelvis were negative. I did discuss the findings with the patient. I do have 


concerns about possible mono as a result monocyte being elevated. Patient states


 that she has had mono in the past and was sick for several months. The mono 


spot did come back positive. I discussed the results with the patient. We will 


go ahead and discharge home this time. She states, or ibuprofen as if pain. She 


is to get plenty of rest. Patient verbalized understanding and agreement with 


plan.





 12/10/2018 8:40:25 pm I had discussed with the patient about possible pain 


medication. I had forgotten to include that at her discharge. I did attempt to 


call the patient to see if she still wanted anything for pain. She was 


complaining of significant flank pain especially at night. I would be willing to


 prescribe 6 of the hydrocodone/Tylenol as 5/325. I left a message but as of 


this time patient has not called back.


Decision to Disposition Date:  Dec 10, 2018


Decision to Disposition Time:  18:56





Depart


Departure


Latest Vital Signs





Vital Signs








  Date Time  Temp Pulse Resp B/P (MAP) Pulse Ox O2 Delivery O2 Flow Rate FiO2


 


12/10/18 19:05  88   100   


 


12/10/18 19:00    134/86 (102)    


 


12/10/18 18:24      Room Air  


 


12/10/18 17:00 98.5  14     








Impression:  


   Primary Impression:  


   Mononucleosis


Condition:  Improved


Disposition:  HOME OR SELF-CARE


Patient Instructions:  Mononucleosis (ED)





Additional Instructions:  


Increase fluid intake.


Get plenty of rest.


No contact sports.


Return to the ER if condition worsens.


Follow up with your primary care provider in the next week.


Take Tylenol or Ibuprofen as needed for pain or body aches.





Problem Qualifiers








   Primary Impression:  


   Mononucleosis


   Infectious mononucleosis etiology:  cytomegalovirus  Infectious mononucleosis


    complication:  without complication  Qualified Codes:  B27.10 - Cytomegalovi


   ral mononucleosis without complications








NISHI LALA                Dec 10, 2018 17:10

## 2018-12-10 NOTE — RADIOLOGY IMAGING REPORT
FACILITY: [facility] 

 

PATIENT NAME: Clary Mays

: 1995

MR: [id number]

V: [id number]

EXAM DATE: 

ORDERING PHYSICIAN: NISHI LALA

TECHNOLOGIST: 

 

Location: [facility]

Patient: Clary Mays

: 1995

MRN: [id number]

Visit/Account:[id number]

Date of Sevice: 12/10/2018

 

ACCESSION #: [id number]

 

COMPUTED TOMOGRAPHY OF THE Abdomen and Pelvis with  CONTRAST

 

INDICATION: Bilateral flank pain.

 

TECHNIQUE: Contiguous axial 3.0 mm CT images were obtained through the abdomen and pelvis  after 75 m
L Isovue-370. Coronal and sagittal reformatted images were submitted.

 

COMPARISON: CT abdomen and pelvis 2018.

 

FINDINGS:

 

Lung bases: The lung bases are clear.

 

Liver and hepatic vasculature:  No focal lesion.

 

Gallbladder and bile ducts:  Normal

 

Spleen:  Normal

 

Pancreas:  Normal

 

Adrenals:  Normal

 

Kidneys, ureters and bladder:  Symmetric enhancement.  No stone or obstruction.  Normal-appearing carmen
dder.

 

Retroperitoneum and aorta:  Normal caliber aorta.

 

GI tract, mesentery and peritoneum: Normal appendix.  No bowel obstruction.  No free fluid or free ai
r.  The stomach is mildly distended, presumably with food.

 

Uterus and adnexa: An IUD is centrally positioned within the uterine fundus.  Both ovaries are mildly
 prominent with several small follicles noted

 

Bones and soft tissues: No acute osseous abnormality.

 

IMPRESSION:

 

1.  No urinary tract calculus or collecting system obstruction.

2.  Normal appendix.

3.  Mildly prominent ovaries.

 

 

One of the following dose optimization techniques was utilized in the performance of this exam: Autom
ated exposure control; adjustment of the mA and/or kV according to the patient's size; or use of an i
terative  reconstruction technique.  Specific details can be referenced in the facility's radiology C
T exam operational policy.

 

 

Report Dictated By: Kaye Stubbs MD at 12/10/2018 6:06 PM

 

Report E-Signed By: Kaye Stubbs MD  at 12/10/2018 6:18 PM

 

WSN:[id number]

## 2018-12-15 ENCOUNTER — HOSPITAL ENCOUNTER (EMERGENCY)
Dept: HOSPITAL 89 - ER | Age: 23
Discharge: HOME | End: 2018-12-15
Payer: COMMERCIAL

## 2018-12-15 VITALS — SYSTOLIC BLOOD PRESSURE: 117 MMHG | DIASTOLIC BLOOD PRESSURE: 76 MMHG

## 2018-12-15 DIAGNOSIS — T50.905A: Primary | ICD-10-CM

## 2018-12-15 PROCEDURE — 99283 EMERGENCY DEPT VISIT LOW MDM: CPT

## 2018-12-15 NOTE — ER REPORT
History and Physical


Time Seen By MD:  00:15


Hx. of Stated Complaint:  


PT HAS HAD MONO AND KIDNEY INFECTION. PT JUST FINISHED ABX. PT HAS HAD FULL BODY





RASH FOR 2 DAYS NOW.


HPI/ROS


CHIEF COMPLAINT: rash





HISTORY OF PRESENT ILLNESS: This is a 23 year old female. Rash over whole body 


after taking course of Bactrim. Red confluent rash, itchy. Using Benadryl the 


last few days without improvement. Rash for about 2 days, just took last dose of


Bactrim yesterday. Also recently diagnosed with Mono. No rash on soles or palms.


No lesions in mouth. No shortness of breath. No chest pains. No nausea or 


vomiting. No dysuria or problems with bowels. No fever or chills.


Allergies:  


Coded Allergies:  


     No Known Drug Allergies (Unverified , 12/15/18)


Home Meds


Active Scripts


Prednisone (PREDNISONE) 20 Mg Tablet, 60 MG PO QDAY, #9 TAB 0 Refills


   Prov:BETTINA FRY MD         12/15/18


Ondansetron 4 Mg Odt (ONDANSETRON 4 MG ODT) 4 Mg Tab.rapdis, 4 MG PO Q6H PRN for


NAUSEA/VOMITING, #20 TAB 0 Refills


   Prov:BETTINA FRY MD         12/6/18


Sulfamethoxazole/Trimet 800-160 Mg Tab (BACTRIM DS TABLET) 1 Each Tablet, 1 TAB 


PO Q12H, #14 TAB 0 Refills


   Prov:BETTINA FRY MD         12/6/18


Reported Medications


[Iud]   No Conflict Check


   8/31/18


Escitalopram Oxalate (LEXAPRO) 20 Mg Tablet, 20 MG PO QDAY, TAB


   8/31/18


Methylphenidate Hcl (METHYLPHENIDATE ER) 36 Mg Tab.er.24, 36 MG PO QDAY


   8/31/18


Reviewed Nurses Notes:  Yes


Hx Smoking:  No


Smoking Status:  Never Smoker


Hx Substance Use Disorder:  No


Hx Alcohol Use:  No


Constitutional





Vital Sign - Last 24 Hours








 12/15/18 12/15/18 12/15/18 12/15/18





 00:07 00:08 00:19 00:30


 


Temp 99.4   


 


Pulse 87  88 


 


Resp 18   


 


B/P (MAP) 128/84 128/84 (99)  117/76 (90)


 


Pulse Ox 95  95 


 


O2 Delivery Room Air   


 


    





 12/15/18 12/15/18  





 00:34 00:49  


 


Pulse 93 92  


 


Pulse Ox 96 95  








Physical Exam


General: Alert, no acute distress.


Skin: Confluent macular rash. Some flaking skin. No pustules or bullae.


ENT: no mucous membrane lesions.





Medical Decision Making


ED Course/Re-evaluation


ED Course


Possible fixed drug eruption versus allergic versus viral rash. No sign of 


Tanner's Fco syndrome. Trial of Prednisone. Continue Benadryl


Decision to Disposition Date:  Dec 15, 2018


Decision to Disposition Time:  00:32





Depart


Departure


Latest Vital Signs





Vital Signs








  Date Time  Temp Pulse Resp B/P (MAP) Pulse Ox O2 Delivery O2 Flow Rate FiO2


 


12/15/18 00:49  92   95   


 


12/15/18 00:30    117/76 (90)    


 


12/15/18 00:07 99.4  18   Room Air  








Impression:  


   Primary Impression:  


   Adverse effects of medication


Condition:  Improved


Disposition:  HOME OR SELF-CARE


New Scripts


Prednisone (PREDNISONE) 20 Mg Tablet


60 MG PO QDAY, #9 TAB 0 Refills


   Prov: BETTINA FRY MD         12/15/18


Patient Instructions:  Acute Rash (ED), Adverse Drug Reaction (ED)





Additional Instructions:  


Your rash appears to be a reaction to the medication Bactrim.


We recommend continuing to use the Benadryl, 50mg every 6 hours as needed for 


itching.


We are starting you on Prednisone 20mg, 3 tablets once a day for 3 days starting


 later this morning.


If not improving, please see a skin specialist (Dermatology) for further 


evaluation.





Problem Qualifiers








   Primary Impression:  


   Adverse effects of medication


   Encounter type:  initial encounter  Qualified Codes:  T50.905A - Adverse e


   ffect of unspecified drugs, medicaments and biological substances, initial 


   encounter








BETTINA FRY MD             Dec 15, 2018 00:38